# Patient Record
Sex: FEMALE | Race: WHITE | HISPANIC OR LATINO | URBAN - METROPOLITAN AREA
[De-identification: names, ages, dates, MRNs, and addresses within clinical notes are randomized per-mention and may not be internally consistent; named-entity substitution may affect disease eponyms.]

---

## 2022-03-17 ENCOUNTER — APPOINTMENT (RX ONLY)
Dept: URBAN - METROPOLITAN AREA CLINIC 15 | Facility: CLINIC | Age: 57
Setting detail: DERMATOLOGY
End: 2022-03-17

## 2022-03-17 VITALS — WEIGHT: 170 LBS | HEIGHT: 67 IN

## 2022-03-17 DIAGNOSIS — Z41.9 ENCOUNTER FOR PROCEDURE FOR PURPOSES OTHER THAN REMEDYING HEALTH STATE, UNSPECIFIED: ICD-10-CM

## 2022-03-17 PROCEDURE — ? RADIESSE + INJECTION

## 2022-03-17 PROCEDURE — ? ADDITIONAL NOTES

## 2022-03-17 ASSESSMENT — LOCATION DETAILED DESCRIPTION DERM
LOCATION DETAILED: LEFT CENTRAL MALAR CHEEK
LOCATION DETAILED: LEFT MEDIAL BUCCAL CHEEK
LOCATION DETAILED: RIGHT MEDIAL BUCCAL CHEEK
LOCATION DETAILED: RIGHT CENTRAL MALAR CHEEK
LOCATION DETAILED: RIGHT INFERIOR MEDIAL MALAR CHEEK
LOCATION DETAILED: LEFT INFERIOR MEDIAL MALAR CHEEK

## 2022-03-17 ASSESSMENT — LOCATION SIMPLE DESCRIPTION DERM
LOCATION SIMPLE: LEFT CHEEK
LOCATION SIMPLE: RIGHT CHEEK

## 2022-03-17 ASSESSMENT — LOCATION ZONE DERM: LOCATION ZONE: FACE

## 2022-03-17 NOTE — PROCEDURE: RADIESSE + INJECTION
Lateral Face Filler  Volume In Cc: 0
Use Map Statement For Sites (Optional): No
Price (Use Numbers Only, No Special Characters Or $): [place] [id number]
Detail Level: Detailed
Lot #: [id number]
Procedural Text: The filler was administered to the treatment areas noted above.
Consent: Written consent obtained. Risks include but not limited to bruising, beading, irregular texture, ulceration, infection, allergic reaction, scar formation, incomplete augmentation, temporary nature, procedural pain.
Marionette Lines Filler  Volume In Cc: 0.4
Number Of Syringes (Required For Inventory): 1
Map Statement: See [address] for Complete Details
Post-Care Instructions: Patient instructed to apply ice to reduce swelling.
Show Inventory Tab: Hide
Cheeks Filler Volume In Cc: 0.2
Expiration Date (Month Year): 02/02/2024
Nasolabial Folds Filler Volume In Cc: 0.3
Filler: Radiesse
Mid Face Filler  Volume In Cc: 0.6

## 2022-03-17 NOTE — PROCEDURE: ADDITIONAL NOTES
Detail Level: Simple
Render Risk Assessment In Note?: no
Additional Notes: . \\n1- Radiesse:$700 \\n\\nPatient was advised today we will do one IPL LASER treatment complimentary

## 2022-03-21 ENCOUNTER — RX ONLY (OUTPATIENT)
Age: 57
Setting detail: RX ONLY
End: 2022-03-21

## 2022-03-21 RX ORDER — MUPIROCIN 20 MG/G
1 OINTMENT TOPICAL BID
Qty: 22 | Refills: 0 | Status: ERX | COMMUNITY
Start: 2022-03-21

## 2022-03-21 RX ORDER — NYSTATIN 100000 [USP'U]/G
1 CREAM TOPICAL BID
Qty: 30 | Refills: 1 | Status: ERX | COMMUNITY
Start: 2022-03-21

## 2022-04-10 ENCOUNTER — RX ONLY (OUTPATIENT)
Age: 57
Setting detail: RX ONLY
End: 2022-04-10

## 2022-04-10 RX ORDER — AZITHROMYCIN DIHYDRATE 500 MG/1
1 TABLET, FILM COATED ORAL QD
Qty: 3 | Refills: 0 | Status: ERX | COMMUNITY
Start: 2022-04-10